# Patient Record
Sex: FEMALE | Race: WHITE | Employment: FULL TIME | URBAN - METROPOLITAN AREA
[De-identification: names, ages, dates, MRNs, and addresses within clinical notes are randomized per-mention and may not be internally consistent; named-entity substitution may affect disease eponyms.]

---

## 2020-05-28 ENCOUNTER — OFFICE VISIT (OUTPATIENT)
Dept: OBGYN CLINIC | Age: 30
End: 2020-05-28

## 2020-05-28 VITALS — SYSTOLIC BLOOD PRESSURE: 98 MMHG | WEIGHT: 122 LBS | DIASTOLIC BLOOD PRESSURE: 72 MMHG

## 2020-05-28 DIAGNOSIS — Z30.011 ENCOUNTER FOR INITIAL PRESCRIPTION OF CONTRACEPTIVE PILLS: ICD-10-CM

## 2020-05-28 DIAGNOSIS — A60.04 HERPES SIMPLEX VULVOVAGINITIS: Primary | ICD-10-CM

## 2020-05-28 RX ORDER — NORETHINDRONE ACETATE AND ETHINYL ESTRADIOL AND FERROUS FUMARATE 1MG-20(24)
1 KIT ORAL DAILY
Qty: 3 PACKAGE | Refills: 4 | Status: SHIPPED | OUTPATIENT
Start: 2020-05-28

## 2020-05-28 RX ORDER — VALACYCLOVIR HYDROCHLORIDE 500 MG/1
500 TABLET, FILM COATED ORAL DAILY
Qty: 90 TAB | Refills: 4 | Status: SHIPPED | OUTPATIENT
Start: 2020-05-28

## 2020-05-28 NOTE — PATIENT INSTRUCTIONS
Oral Contraceptives (By mouth) Prevents pregnancy. Oral contraceptives are birth control pills. Brand Name(s): Aftera, Altavera, Alyacen 1/35, Alyacen 7/7/7, Amethia, Amethia Lo, Ashtyn, Apri, Paul, Kellie, Prosper Sanders, VICKIE, Fady, Blisovi 24 Fe There may be other brand names for this medicine. When This Medicine Should Not Be Used: You should not use this medicine if you have had an allergic reaction to oral contraceptives, or if you are pregnant. Do not use this medicine if you have breast cancer, cancer of the uterus, diabetes, heart disease, high blood pressure, or a history of blood clots, heart attack, or stroke. Do not use this medicine if you have problems with your liver (such as liver tumor), jaundice (yellowish eyes or skin), certain types of headaches, unusual vaginal bleeding, or if you are having a surgery that needs bedrest.  
How to Use This Medicine:  
Tablet, Chewable Tablet, Coated Tablet · Your doctor will tell you how much medicine to use. Do not use more than directed. · Read and follow the patient instructions that come with this medicine. Talk to your doctor or pharmacist if you have any questions. · You may take this medicine with food to lessen stomach upset. · Keep your pills in the container you receive from the pharmacy. Take the pills in the order they appear in the container. · Take your pill at the same time every day. Swallow the tablet whole. Do not crush, break, or chew it. · If you are using the chewable tablets, you may chew the tablet completely before swallowing. Drink a full glass (8 ounces) of water right after swallowing. If a dose is missed: · If one dose is missed: Take the missed dose as soon as you remember. Take 2 tablets if you do not remember until the next day. Ask your doctor or pharmacist if you need to USE ANOTHER KIND OF BIRTH CONTROL until your period begins. · If you miss more than one dose, read and follow the instructions on the package about missing doses carefully. Ask your doctor or pharmacist if you need more information. How to Store and Dispose of This Medicine: · Store the medicine in a closed container at room temperature, away from heat, moisture, and direct light. · Ask your pharmacist, doctor, or health caregiver about the best way to dispose of any outdated medicine or medicine no longer needed. · Keep all medicine out of the reach of children. Never share your medicine with anyone. Drugs and Foods to Avoid: Ask your doctor or pharmacist before using any other medicine, including over-the-counter medicines, vitamins, and herbal products. · Make sure your doctor knows if you are using antibiotics (such as ampicillin, rifampin, tetracycline, Omnipen®, Rimactane®) or antifungals (such as griseofulvin, Grifulvin V®), medicine for seizures (such as phenobarbital, phenylbutazone, phenytoin, carbamazepine, felbamate, oxcarbazepine, topiramate, primidone, Luminal®, Dilantin®, Tegretol®, Felbatol®, Trileptal®, Topamax®, Mysoline®), modafinil (Provigil®), or medicine to treat HIV or AIDS (such as ritonavir, Norvir®). · Tell your doctor if you are also using Smiley's Wort, atorvastatin (Lipitor®), vitamin C (ascorbic acid), acetaminophen (Tylenol®), itraconazole (Sporanox®), ketoconazole (Dufm Gander), cyclosporine (Gengraf®, Neoral®, Sandimmune®), prednisolone (Delta Cortef®, Prelone®), theophylline (Pedro-Dur®, Slo-Phyllin®, Gyrocaps®), temazepam (Restoril®), morphine (Astramorph PF®, Duramorph®, Avinza®, MS Contin®, Roxanol®), or salicylic acid. Warnings While Using This Medicine: · Although you are using this medicine to prevent pregnancy, you should know that using this medicine while you are pregnant could harm the unborn baby. If you think you have become pregnant while using the medicine, tell your doctor right away. · Use a different kind of birth control during the first 3 weeks of oral contraceptive use to make sure you are protected from pregnancy. · Make sure your doctor knows if you are breastfeeding, or if you have lupus, edema (fluid retention), seizure disorder, asthma, migraine headaches, or a history of depression. Tell your doctor if have breast lumps, high cholesterol, gallbladder disease, liver disease, kidney disease, or irregular monthly periods. · This medicine will not protect you from getting HIV/AIDS or other sexually transmitted diseases. If this is a concern for you, talk with your doctor. · If you smoke while using birth control pills, you increase your risk of having a heart attack, stroke, or blood clot. Your risk is even higher if you are over age 28, if you have diabetes, high blood pressure, high cholesterol, or if you are overweight. Talk with your doctor about ways to stop smoking. Keep your diabetes under control. Ask your doctor about diet and exercise to control your weight and blood cholesterol level. · Tell any doctor or dentist who treats you that you are using this medicine. You may need to stop using this medicine several days before you have surgery or medical tests. · Check with your eye doctor if you wear contact lenses and you have vision problems or eye discomfort. · You should see your doctor on a regular basis (every 6 months or 1 year) while taking birth control pills. · If you miss two periods in a row, call your doctor for a pregnancy test before you take any more pills. · It is best to wait 2 or 3 months after stopping birth control pills before you try to get pregnant. Possible Side Effects While Using This Medicine:  
Call your doctor right away if you notice any of these side effects: · Allergic reaction: Itching or hives, swelling in your face or hands, swelling or tingling in your mouth or throat, chest tightness, trouble breathing · Chest pain, shortness of breath, or coughing up blood. · Heavy vaginal bleeding. · Irregular or missed menstrual period. · Lumps in breast. 
· Nausea, vomiting, loss of appetite, pain in your upper stomach. · Numbness or weakness in your arm or leg, or on one side of your body. · Pain in your lower leg (calf). · Rapid weight gain. · Sudden or severe headache, problems with vision, speech, or walking. · Swelling in your hands, ankles, or feet. · Yellowing of your skin or the whites of your eyes. If you notice these less serious side effects, talk with your doctor: · Bloated feeling. · Breast tenderness, pain, swelling, or discharge. · Changes in appetite. · Contact lens discomfort. · Depression or mood changes. · Mild headache. · Mild skin rash or itching, or change in skin color. · Sensitivity to sunlight. · Stomach cramps. · Tiredness. · Vaginal spotting or light bleeding, itching, or discharge. · Weight changes. If you notice other side effects that you think are caused by this medicine, tell your doctor. Call your doctor for medical advice about side effects. You may report side effects to FDA at 3-091-FDA-8930 © 2017 [address] Information is for End User's use only and may not be sold, redistributed or otherwise used for commercial purposes. The above information is an  only. It is not intended as medical advice for individual conditions or treatments. Talk to your doctor, nurse or pharmacist before following any medical regimen to see if it is safe and effective for you.

## 2020-05-28 NOTE — LETTER
NOTIFICATION RETURN TO SCHOOL 
 
5/28/2020 2:18 PM 
 
Ms. Stephanie Theodore [address] [address] To Whom It May Concern: 
 
Stephanie Theodore is was seen at our office today. If there are questions or concerns please have the patient contact our office.  
 
 
 
Sincerely, 
 
 
 
 
Harrison Guerin MD

## 2020-05-28 NOTE — PROGRESS NOTES
Problem Visit    [address] is a 26 y.o.  presenting for problem visit. Her main concern today is obtaining prescriptions for medication. She wants a refill of Valtrex, states she recently had an HSV outbreak and has been out of her antiviral since .     She is also interested in starting on oral contraceptives. Was on Rebekah in the past.     She recently moved to [place] since getting out of being incarcerated in .  Working at [employer] as a [employer] Ob/Gyn Hx:    - CS x 2  LMP- 05/15/20  Menses- regular  Contraception- none currently, interested in starting  SA- not currently      History reviewed. No pertinent past medical history. Past Surgical History:   Procedure Laterality Date    HX  SECTION         History reviewed. No pertinent family history.     Social History     Socioeconomic History    Marital status: SINGLE     Spouse name: Not on file    Number of children: Not on file    Years of education: Not on file    Highest education level: Not on file   Occupational History    Not on file   Social Needs    Financial resource strain: Not on file    Food insecurity     Worry: Not on file     Inability: Not on file    Transportation needs     Medical: Not on file     Non-medical: Not on file   Tobacco Use    Smoking status: Current Every Day Smoker     Packs/day: 1.00    Smokeless tobacco: Former User   Substance and Sexual Activity    Alcohol use: Yes     Comment: social    Drug use: Not Currently    Sexual activity: Not Currently     Partners: Male     Birth control/protection: None   Lifestyle    Physical activity     Days per week: Not on file     Minutes per session: Not on file    Stress: Not on file   Relationships    Social connections     Talks on phone: Not on file     Gets together: Not on file     Attends Jewish service: Not on file     Active member of club or organization: Not on file     Attends meetings of clubs or organizations: Not on file Relationship status: Not on file    Intimate partner violence     Fear of current or ex partner: Not on file     Emotionally abused: Not on file     Physically abused: Not on file     Forced sexual activity: Not on file   Other Topics Concern    Not on file   Social History Narrative    Not on file           Not on File    Review of Systems - History obtained from the patient  Constitutional: negative for weight loss, fever, night sweats  HEENT: negative for hearing loss, earache, congestion, snoring, sorethroat  CV: negative for chest pain, palpitations, edema  Resp: negative for cough, shortness of breath, wheezing  GI: negative for change in bowel habits, abdominal pain, black or bloody stools  : negative for frequency, dysuria, hematuria, vaginal discharge  MSK: negative for back pain, joint pain, muscle pain  Breast: negative for breast lumps, nipple discharge, galactorrhea  Skin :negative for itching, rash, hives  Neuro: negative for dizziness, headache, confusion, weakness  Psych: negative for anxiety, depression, change in mood  Heme/lymph: negative for bleeding, bruising, pallor    Physical Exam    Visit Vitals  BP 98/72 (BP 1 Location: Left arm, BP Patient Position: Sitting)   Wt 122 lb (55.3 kg)   LMP 05/15/2020 (Approximate)         OBGyn Exam      Constitutional  · Appearance: well-nourished, well developed, alert, in no acute distress    HENT  · Head and Face: appears normal    Neck  · Inspection/Palpation: normal appearance, no masses or tenderness  · Thyroid: gland size normal, nontender    Chest  · Respiratory Effort: non-labored breathing    Cardiovascular  · Extremities: no peripheral edema    Gastrointestinal  · Abdominal Examination: abdomen non-distended  · Liver and spleen: no hepatomegaly present, spleen not palpable  · Hernias: no hernias identified      Skin  · General Inspection: no rash, no lesions identified    Neurologic/Psychiatric  · Mental Status:  · Orientation: grossly oriented to person, place and time  · Mood and Affect: mood normal, affect appropriate      Assessment/Plan:    1. Herpes simplex vulvovaginitis  Discussed her symptoms and diagnosis. She opts for daily prophylactic treatment. Rx valtrex given. 2. Encounter for initial prescription of contraceptive pills  Discussed contraceptive methods and she prefers a pill. Rx Junel 24 fe given. Smoking cessation discussed and recommended.        Maricarmen Hernandez MD

## 2021-02-10 ENCOUNTER — HOSPITAL ENCOUNTER (EMERGENCY)
Age: 31
Discharge: HOME | End: 2021-02-10
Attending: FAMILY MEDICINE
Payer: MEDICAID

## 2021-02-10 VITALS
DIASTOLIC BLOOD PRESSURE: 72 MMHG | HEIGHT: 61 IN | RESPIRATION RATE: 16 BRPM | SYSTOLIC BLOOD PRESSURE: 111 MMHG | TEMPERATURE: 98.1 F | HEART RATE: 79 BPM | WEIGHT: 120 LBS | BODY MASS INDEX: 22.66 KG/M2

## 2021-02-10 DIAGNOSIS — A64 SEXUALLY TRANSMITTED INFECTION: Primary | ICD-10-CM

## 2021-02-10 LAB
APPEARANCE UR: CLEAR
BACTERIA URNS QL MICRO: ABNORMAL /HPF
BILIRUB UR QL: NEGATIVE
COLOR UR: YELLOW
EPITH CASTS URNS QL MICRO: ABNORMAL /LPF
GLUCOSE UR STRIP.AUTO-MCNC: NEGATIVE MG/DL
HCG UR QL: NEGATIVE
HGB UR QL STRIP: ABNORMAL
KETONES UR QL STRIP.AUTO: NEGATIVE MG/DL
LEUKOCYTE ESTERASE UR QL STRIP.AUTO: ABNORMAL
NITRITE UR QL STRIP.AUTO: NEGATIVE
OTHER,OTHU: ABNORMAL
PH UR STRIP: 6 [PH] (ref 5–8)
PROT UR STRIP-MCNC: NEGATIVE MG/DL
RBC #/AREA URNS HPF: ABNORMAL /HPF (ref 0–5)
SP GR UR REFRACTOMETRY: 1.01 (ref 1–1.03)
UA: UC IF INDICATED,UAUC: ABNORMAL
UROBILINOGEN UR QL STRIP.AUTO: 0.1 EU/DL (ref 0.2–1)
WBC CASTS URNS QL MICRO: ABNORMAL /LPF
WBC URNS QL MICRO: ABNORMAL /HPF (ref 0–4)

## 2021-02-10 PROCEDURE — 81025 URINE PREGNANCY TEST: CPT

## 2021-02-10 PROCEDURE — 87591 N.GONORRHOEAE DNA AMP PROB: CPT

## 2021-02-10 PROCEDURE — 74011250636 HC RX REV CODE- 250/636: Performed by: FAMILY MEDICINE

## 2021-02-10 PROCEDURE — 99283 EMERGENCY DEPT VISIT LOW MDM: CPT

## 2021-02-10 PROCEDURE — 74011000250 HC RX REV CODE- 250: Performed by: FAMILY MEDICINE

## 2021-02-10 PROCEDURE — 87086 URINE CULTURE/COLONY COUNT: CPT

## 2021-02-10 PROCEDURE — 96372 THER/PROPH/DIAG INJ SC/IM: CPT

## 2021-02-10 PROCEDURE — 74011250637 HC RX REV CODE- 250/637: Performed by: FAMILY MEDICINE

## 2021-02-10 PROCEDURE — 81001 URINALYSIS AUTO W/SCOPE: CPT

## 2021-02-10 RX ORDER — QUETIAPINE FUMARATE 50 MG/1
50 TABLET, FILM COATED ORAL 2 TIMES DAILY
COMMUNITY

## 2021-02-10 RX ORDER — AZITHROMYCIN 250 MG/1
1000 TABLET, FILM COATED ORAL DAILY
Status: DISCONTINUED | OUTPATIENT
Start: 2021-02-11 | End: 2021-02-10

## 2021-02-10 RX ORDER — AZITHROMYCIN 250 MG/1
1000 TABLET, FILM COATED ORAL
Status: COMPLETED | OUTPATIENT
Start: 2021-02-10 | End: 2021-02-10

## 2021-02-10 RX ADMIN — LIDOCAINE HYDROCHLORIDE 250 MG: 10 INJECTION, SOLUTION INFILTRATION; PERINEURAL at 16:34

## 2021-02-10 RX ADMIN — AZITHROMYCIN MONOHYDRATE 1000 MG: 250 TABLET ORAL at 16:34

## 2021-02-10 NOTE — ED PROVIDER NOTES
EMERGENCY DEPARTMENT HISTORY AND PHYSICAL EXAM      Date: 2/10/2021  Patient Name: Janae Pace    History of Presenting Illness     Chief Complaint   Patient presents with    Vaginal Discharge       History Provided By:     HPI: Janae Pace, is an extremely pleasant 31 y.o. female presenting to the ED with a chief complaint of vaginal discharge. She states it started 2 weeks ago. The vaginal discharge started off clear but now it is thicker and yellow. She is having some vaginal pain as well as a little bit of dysuria. She thinks there may have been a little bit of blood in her urine. She denies any frequency. There has been no suprapubic pain. No flank pain. She denies any fevers. She states she is sexually active with one partner. There are no other complaints, changes, or physical findings at this time. PCP: None    No current facility-administered medications on file prior to encounter. Current Outpatient Medications on File Prior to Encounter   Medication Sig Dispense Refill    QUEtiapine (SEROqueL) 50 mg tablet Take 50 mg by mouth two (2) times a day.  valACYclovir (VALTREX) 500 mg tablet Take 1 Tab by mouth daily. 90 Tab 4    norethindrone-e estradiol-iron (Junel Fe 24) 1 mg-20 mcg (24)/75 mg (4) tab Take 1 Tab by mouth daily. 3 Package 4       Past History     Past Medical History:  History reviewed. No pertinent past medical history. Past Surgical History:  Past Surgical History:   Procedure Laterality Date    HX  SECTION         Family History:  No family history on file. Social History:  Social History     Tobacco Use    Smoking status: Current Every Day Smoker     Packs/day: 1.00    Smokeless tobacco: Former User   Substance Use Topics    Alcohol use: Yes     Comment: social    Drug use: Not Currently       Allergies:   Allergies   Allergen Reactions    Penicillins Hives         Review of Systems     Review of Systems   Constitutional: Negative for activity change, appetite change, chills, fatigue and fever. HENT: Negative for congestion and sore throat. Eyes: Negative for photophobia and visual disturbance. Respiratory: Negative for cough, shortness of breath and wheezing. Cardiovascular: Negative for chest pain, palpitations and leg swelling. Gastrointestinal: Negative for abdominal pain, diarrhea, nausea and vomiting. Endocrine: Negative for cold intolerance and heat intolerance. Genitourinary: Positive for vaginal discharge and vaginal pain. Musculoskeletal: Negative for gait problem and joint swelling. Skin: Negative for color change and rash. Neurological: Negative for dizziness and headaches. Physical Exam       Physical Exam  Constitutional:       Appearance: She is well-developed. HENT:      Head: Normocephalic and atraumatic. Mouth/Throat:      Mouth: Mucous membranes are moist.      Pharynx: Oropharynx is clear. Eyes:      Conjunctiva/sclera: Conjunctivae normal.      Pupils: Pupils are equal, round, and reactive to light. Neck:      Musculoskeletal: Normal range of motion and neck supple. Cardiovascular:      Rate and Rhythm: Normal rate and regular rhythm. Heart sounds: No murmur. Pulmonary:      Effort: No respiratory distress. Breath sounds: No stridor. No wheezing, rhonchi or rales. Abdominal:      General: There is no distension. Tenderness: There is no abdominal tenderness. There is no rebound. Genitourinary:     Comments: Pt deferred pelvic exam   Skin:     General: Skin is warm and dry. Neurological:      General: No focal deficit present. Mental Status: She is alert and oriented to person, place, and time.    Psychiatric:         Mood and Affect: Mood normal.         Behavior: Behavior normal.         Lab and Diagnostic Study Results     Labs -     Recent Results (from the past 12 hour(s))   HCG URINE, QL    Collection Time: 02/10/21  3:45 PM   Result Value Ref Range HCG urine, QL Negative Negative     URINALYSIS W/ REFLEX CULTURE    Collection Time: 02/10/21  4:00 PM    Specimen: Urine   Result Value Ref Range    Color Yellow      Appearance Clear Clear      Specific gravity 1.010 1.003 - 1.030      pH (UA) 6.0 5.0 - 8.0      Protein Negative Negative mg/dL    Glucose Negative Negative mg/dL    Ketone Negative Negative mg/dL    Bilirubin Negative Negative      Blood Large (A) Negative      Urobilinogen 0.1 (L) 0.2 - 1.0 EU/dL    Nitrites Negative Negative      Leukocyte Esterase Large (A) Negative      WBC PENDING /hpf    RBC PENDING /hpf    Epithelial cells PENDING /lpf    Bacteria PENDING /hpf    UA:UC IF INDICATED PENDING        Radiologic Studies -   @lastxrresult@  CT Results  (Last 48 hours)    None        CXR Results  (Last 48 hours)    None            Medical Decision Making   - I am the first provider for this patient. - I reviewed the vital signs, available nursing notes, past medical history, past surgical history, family history and social history. - Initial assessment performed. The patients presenting problems have been discussed, and they are in agreement with the care plan formulated and outlined with them. I have encouraged them to ask questions as they arise throughout their visit. Vital Signs-Reviewed the patient's vital signs. Patient Vitals for the past 12 hrs:   Temp Pulse Resp BP   02/10/21 1544 98.1 °F (36.7 °C) 79 16 111/72         ED Course/ Provider Notes (Medical Decision Making):     Patient presented to the emergency department with a chief complaint of vaginal discharge. Patient had to leave to  her child from  before her urinalysis resulted. GC and chlamydia will take approximately 3 days to result. We had a discussion about options and elected to empirically treat her for sexually transmitted infection with azithromycin and Rocephin.   Patient will be called with results of her tests            China Connor was given a thorough list of signs and symptoms that would warrant an immediate return to the emergency department. Otherwise Melissa Brooks will follow up with PCP. Procedures   Medical Decision Makingedical Decision Making  Performed by: Shey Middleton DO  Procedures  None       Disposition   Disposition:     Home          DISCHARGE PLAN:    1. Current Discharge Medication List      CONTINUE these medications which have NOT CHANGED    Details   valACYclovir (VALTREX) 500 mg tablet Take 1 Tab by mouth daily. Qty: 90 Tab, Refills: 4      norethindrone-e estradiol-iron (Junel Fe 24) 1 mg-20 mcg (24)/75 mg (4) tab Take 1 Tab by mouth daily. Qty: 3 Package, Refills: 4               2.   Follow-up Information    None           3. Return to ED if worse       4. Current Discharge Medication List            Diagnosis     Clinical Impression:   1. Sexually transmitted infection        Attestations:    Shey Middleton DO    Please note that this dictation was completed with [employer], the computer voice recognition software. Quite often unanticipated grammatical, syntax, homophones, and other interpretive errors are inadvertently transcribed by the computer software. Please disregard these errors. Please excuse any errors that have escaped final proofreading. Thank you.

## 2021-02-11 LAB
BACTERIA SPEC CULT: NORMAL
SPECIAL REQUESTS,SREQ: NORMAL

## 2021-02-12 LAB
C TRACH DNA SPEC QL NAA+PROBE: NEGATIVE
N GONORRHOEA DNA SPEC QL NAA+PROBE: POSITIVE
SAMPLE TYPE: ABNORMAL
SERVICE CMNT-IMP: ABNORMAL
SPECIMEN SOURCE: ABNORMAL